# Patient Record
Sex: MALE | Race: OTHER | Employment: UNEMPLOYED | URBAN - METROPOLITAN AREA
[De-identification: names, ages, dates, MRNs, and addresses within clinical notes are randomized per-mention and may not be internally consistent; named-entity substitution may affect disease eponyms.]

---

## 2018-08-28 ENCOUNTER — HOSPITAL ENCOUNTER (EMERGENCY)
Age: 33
Discharge: HOME | End: 2018-08-28
Attending: EMERGENCY MEDICINE
Payer: SELF-PAY

## 2018-08-28 VITALS
SYSTOLIC BLOOD PRESSURE: 169 MMHG | BODY MASS INDEX: 32.96 KG/M2 | HEART RATE: 84 BPM | OXYGEN SATURATION: 95 % | RESPIRATION RATE: 16 BRPM | DIASTOLIC BLOOD PRESSURE: 99 MMHG | HEIGHT: 67 IN | WEIGHT: 210 LBS | TEMPERATURE: 98.5 F

## 2018-08-28 DIAGNOSIS — M51.26 LUMBAR HERNIATED DISC: Primary | ICD-10-CM

## 2018-08-28 PROCEDURE — 99282 EMERGENCY DEPT VISIT SF MDM: CPT

## 2018-08-28 RX ORDER — TRAMADOL HYDROCHLORIDE 50 MG/1
50 TABLET ORAL
COMMUNITY
End: 2018-08-28

## 2018-08-28 RX ORDER — PREDNISONE 10 MG/1
TABLET ORAL
Qty: 21 TAB | Refills: 0 | Status: SHIPPED | OUTPATIENT
Start: 2018-08-28

## 2018-08-28 RX ORDER — OXYCODONE AND ACETAMINOPHEN 5; 325 MG/1; MG/1
1 TABLET ORAL
Qty: 10 TAB | Refills: 0 | Status: SHIPPED | OUTPATIENT
Start: 2018-08-28

## 2018-08-28 NOTE — ED PROVIDER NOTES
HPI Comments: Healthy; presents with 2 weeks or right-sided low back and RLE pain; radiates from buttock to foot; some numbness in right foot; no B/B incontinence; has been seen in a \"clinic\" previously and prescribed NSAID and steroid without relief. Pain is moderate. Patient is a 26 y.o. male presenting with back pain and leg pain. Back Pain    Associated symptoms include leg pain. Leg Pain    Associated symptoms include back pain. No past medical history on file. Past Surgical History:   Procedure Laterality Date    HX APPENDECTOMY           No family history on file. Social History     Social History    Marital status: SINGLE     Spouse name: N/A    Number of children: N/A    Years of education: N/A     Occupational History    Not on file. Social History Main Topics    Smoking status: Never Smoker    Smokeless tobacco: Never Used    Alcohol use No      Comment: occsdionally    Drug use: No    Sexual activity: Yes     Partners: Female     Other Topics Concern    Not on file     Social History Narrative    ** Merged History Encounter **         ** Merged History Encounter **              ALLERGIES: Review of patient's allergies indicates no known allergies. Review of Systems   Musculoskeletal: Positive for back pain. All other systems reviewed and are negative. Vitals:    08/28/18 1731   BP: (!) 169/99   Pulse: 84   Resp: 16   Temp: 98.5 °F (36.9 °C)   SpO2: 95%   Weight: 95.3 kg (210 lb)   Height: 5' 7\" (1.702 m)            Physical Exam   Constitutional: He appears well-developed and well-nourished. HENT:   Head: Normocephalic and atraumatic. Eyes: Conjunctivae are normal. No scleral icterus. Neck: No JVD present. No tracheal deviation present. Cardiovascular: Normal rate. Pulmonary/Chest: Effort normal.   Abdominal: He exhibits no distension. Musculoskeletal: He exhibits no edema.    Right buttock and right low back tenderness   Neurological: He is alert.   Oriented; normal LE strength and sensation   Skin: No rash noted. No pallor. Psychiatric: He has a normal mood and affect. [facility]      ED Course       Procedures      A/P: right low back and RLE pain - c/w HNP vs less likely sciatica; no red flags; Prednisone, limited percocet; spine, [facility] f/u.   Trevor Ross MD  5:50 PM

## 2018-08-28 NOTE — DISCHARGE INSTRUCTIONS
Hernia de disco: Instrucciones de cuidado - [ Herniated Disc: Care Instructions ]  Instrucciones de cuidado    Los huesos que gonzalo la columna vertebral de la espalda están protegidos de roces e impacto por pequeños discos. Si hank de [employer] se daña, podría sobresalir o romperse (herniar). Valentina hernia de disco puede ser el resultado del desgaste normal por envejecimiento o de valentina lesión o enfermedad. Si valentina hernia de disco presiona sobre un nervio, le puede causar dolor y entumecimiento en la pierna (ciática) y/o dolor de espalda. Es posible sanar valentina hernia de disco con unas cuantas semanas o meses de descanso, medicamentos y ejercicios. En algunos casos se podría necesitar cirugía. La atención de seguimiento es valentina parte clave de leal tratamiento y seguridad. Asegúrese de hacer y acudir a todas las citas, y llame a leal médico si está teniendo problemas. También es valentina buena idea saber los resultados de sumit exámenes y mantener valentina lista de los medicamentos que stephane. ¿Cómo puede cuidarse en el hogar? · [employer] medicamentos exactamente tariq le fueron recetados. Llame a leal médico si petty estar teniendo problemas con leal medicamento. · Pregúntele a leal médico si puede gris un analgésico (medicamento para el dolor) de venta jean-pierre, tariq acetaminofén (Tylenol), ibuprofeno (Advil, Motrin) o naproxeno (Aleve). Anne y siga todas las instrucciones de la [place]. · No tome dos o más analgésicos al mismo tiempo a menos que el médico se lo haya indicado. Muchos analgésicos contienen acetaminofén, es decir, Tylenol. El exceso de acetaminofén (Tylenol) puede ser dañino. · Si el dolor es intenso, descanse la espalda. · Evite los movimientos y las posiciones que aumenten el dolor o el entumecimiento. · Intente hacer caminatas cortas o actividades suaves que no le causen dolor. Aun si siente algún dolor, es importante que [place] los músculos [place] y mary jo. · Utilice calor o frío para aliviar el dolor.   ¨ Para aplicar calor, póngase valentina bolsa de agua tibia, valentina almohadilla térmica a baja temperatura o valentina compresa tibia [employer]. No se vaya a dormir con valentina almohadilla térmica sobre la piel. ¨ Para utilizar frío, póngase hielo o valentina compresa fría sobre la rosa arun un período de 10 a 20 minutos cada vez. Póngase un paño montano entre el hielo y la piel. · Leal médico le podría recomendar un programa de fisioterapia, en el que aprenderá ejercicios que puede hacer en el hogar. Estos ejercicios fortalecen los músculos que sostienen la parte baja de la espalda y previenen valentina Lud[employer]ans. · Mantenga un peso saludable. Volta puede reducir la carga en la espalda. · Deje de fumar, si usted fuma. Si necesita ayuda para dejar de fumar, hable con leal médico sobre programas y medicamentos para dejar de fumar. Estos pueden aumentar sumit probabilidades de dejar el hábito para siempre. · Para evitar lesiones en la espalda al levantar peso:  [address] piernas, no con la espalda, poniéndose en cuclillas y [address] [facility]. Evite doblarse hacia adelante por la cintura al levantar algo. ¨ Levántese despacio. ¨ Mantenga la carga tan cerca de leal cuerpo tariq sea posible, a la altura del ombligo. ¨ Evite girar o voltear el cuerpo mientras sostenga un objeto pesado. ¨ Pida ayuda si necesita levantar un objeto pesado. Nunca lo levante por encima del nivel de los hombros.  ¿Cuándo debe pedir ayuda? Llame al 911 en cualquier momento que considere que necesita atención de [place]. Por ejemplo, llame si:    · Es completamente incapaz de  valentina pierna.    Llame a leal médico ahora mismo o busque atención médica inmediata si:    · Tiene síntomas nuevos o peores en los brazos, las piernas, el pecho, el abdomen o las nalgas (glúteos). Los síntomas pueden incluir:  ¨ Entumecimiento u hormigueo. ¨ Debilidad.   ¨ Dolor.     · Pierde el control de la vejiga o del intestino.    Preste especial atención a los cambios en leal willard y asegúrese de comunicarse con leal médico si:    · No mejora tariq se esperaba. ¿Dónde puede encontrar más información en inglés? Elsy Jane a http://emanuel-steven.info/. Escriba F534 en la búsqueda para aprender más acerca de \"Hernia de disco: Instrucciones de cuidado - [ Herniated Disc: Care Instructions ]. \"  Revisado: 29 noviembre, 2017  Versión del contenido: 11.7  © 9859-6590 Healthwise, Incorporated. Las instrucciones de cuidado fueron adaptadas bajo licencia por [employer] (which disclaims liability or warranty for this information). Si usted tiene [place] afección médica o sobre estas instrucciones, siempre pregunte a leal profesional de willard. Healthwise, Incorporated niega toda garantía o responsabilidad por leal uso de esta información.

## 2018-08-28 NOTE — ED TRIAGE NOTES
Pt c/o right leg pain x15 days, pain radiates from right hip down posterior leg into foot. Pt stating his right foot is going numb and swollen. Pt using crutches. Pt has not seen PCP. Pt taking tramadol for pain. Pt denies loss of bowel or bladder function.

## 2018-08-28 NOTE — ED NOTES
Triage completed using [facility] phone for translation (Interpretor #[id number]). Plan of care discussed and questions answered.

## 2023-01-04 ENCOUNTER — OFFICE VISIT (OUTPATIENT)
Dept: NEUROLOGY | Age: 38
End: 2023-01-04
Payer: SUBSIDIZED

## 2023-01-04 VITALS
DIASTOLIC BLOOD PRESSURE: 70 MMHG | SYSTOLIC BLOOD PRESSURE: 120 MMHG | HEART RATE: 96 BPM | OXYGEN SATURATION: 99 % | BODY MASS INDEX: 32.89 KG/M2 | HEIGHT: 67 IN

## 2023-01-04 DIAGNOSIS — R56.9 SEIZURES (HCC): Primary | ICD-10-CM

## 2023-01-04 PROCEDURE — 99245 OFF/OP CONSLTJ NEW/EST HI 55: CPT | Performed by: PSYCHIATRY & NEUROLOGY

## 2023-01-04 RX ORDER — METFORMIN HYDROCHLORIDE 1000 MG/1
1000 TABLET ORAL 2 TIMES DAILY WITH MEALS
COMMUNITY

## 2023-01-04 NOTE — PROGRESS NOTES
NEUROLOGY NEW PATIENT OFFICE CONSULTATION      1/4/2023    RE: Bubba Richard         1985      REFERRED BY:  Dominguez Luevano MD        CHIEF COMPLAINT:  This is Bubba Richard is a 42 y.o. male  [employer] who had concerns including Seizure. HPI:     On 7/20/22, patient was admitted at [address] [place] [address] for twitching of the R UE and L head rotating to the L for 15 secs happening several times that day. In the ER Blood sugar was 463. Admits to not taking his medications. Patient seen by a neurologist. MRI brain and EEG were both normal. Patient was discharged on Keppra 1500 mg BID but ran out of it 3 months ago with no seizures. Blood sugar now better controlled. ROS  (-) fever  (-) rash  All other systems reviewed and are negative    Past Medical Hx  No past medical history on file. Social Hx  Social History     Socioeconomic History    Marital status: SINGLE   Tobacco Use    Smoking status: Never    Smokeless tobacco: Never   Substance and Sexual Activity    Alcohol use: No     Comment: occsdionally    Drug use: No    Sexual activity: Yes     Partners: Female   Social History Narrative    ** Merged History Encounter **         ** Merged History Encounter **            Family Hx  No family history on file. ALLERGIES  No Known Allergies    CURRENT MEDS  Current Outpatient Medications   Medication Sig Dispense Refill    metFORMIN (GLUCOPHAGE) 1,000 mg tablet Take 1,000 mg by mouth two (2) times daily (with meals). empagliflozin (Jardiance) 25 mg tablet Take  by mouth daily. predniSONE (STERAPRED DS) 10 mg dose pack As directed. (Patient not taking: Reported on 1/4/2023) 21 Tab 0    oxyCODONE-acetaminophen (PERCOCET) 5-325 mg per tablet Take 1 Tab by mouth every four (4) hours as needed for Pain. Max Daily Amount: 6 Tabs.  (Patient not taking: Reported on 1/4/2023) 10 Tab 0    cyclobenzaprine (FLEXERIL) 10 mg tablet Take 1 Tab by mouth three (3) times daily as needed for Muscle Spasm(s). (Patient not taking: Reported on 1/4/2023) 20 Tab 0           PREVIOUS WORKUP: (reviewed)  IMAGING:    CT Results (recent):  Results from Hospital Encounter encounter on 06/28/11    CT ABDOMEN PELVIS WITHOUT CONTRAST    Narrative  **Final Report**      ICD Codes / Adm. Diagnosis: 12  692243 / Back Pain  Urinary Pain  Examination:  CT ABD PELVIS WO CON  - [id number] - Jun 28 2011 12:20PM  Accession No:  [id number]  Reason:  abdominal pain      REPORT:  INDICATION:   abdominal pain    EXAM:  CT ABDOMEN, PELVIS WITHOUT CONTRAST    COMPARISON:  None    TECHNIQUE: 5 mm axial images were obtained through the abdomen and pelvis  with no oral or intravenous contrast material.    FINDINGS:    The lung bases are clear. There is hepatic steatosis. The gallbladder is  normal.  The spleen and pancreas are normal.  The adrenal glands are normal.  The left kidney is normal.  There is mild right hydroureteronephrosis with  a 3 mm calculus in the right posterior urinary bladder wall possibly at the  ureter vesicular junction. There is no free intraperitoneal air or fluid. There is no bowel wall thickening or dilatation. The appendix is normal.    There is no pelvic free fluid. .  There is no pelvic lymphadenopathy. The  osseous structures are unremarkable. IMPRESSION:    1. Mild right hydronephrosis with a 3 mm calculus in the posterior right  aspect of the urinary bladder which may be in the ureterovesicular junction. This is visible on the  radiograph. 2. Hepatic steatosis. Signing/Reading Doctor: Karlos Rao ([id number])  Transcribed: n/a on 06/28/2011  Approved: Karlos Rao ([id number])  06/28/2011        Distribution:  Attending Doctor: Eryn Walsh      MRI Results (recent):  No results found for this or any previous visit. IR Results (recent):  No results found for this or any previous visit. VAS/US Results (recent):  No results found for this or any previous visit.           LABS (reviewed)  Results for orders placed or performed during the hospital encounter of 07/11/14   URINALYSIS W/ REFLEX CULTURE    Specimen: Urine   Result Value Ref Range    Color YELLOW/STRAW      Appearance CLEAR CLEAR      Specific gravity 1.017 1.003 - 1.030      pH (UA) 6.0 5.0 - 8.0      Protein NEGATIVE NEG mg/dL    Glucose NEGATIVE NEG mg/dL    Ketone NEGATIVE NEG mg/dL    Bilirubin NEGATIVE NEG      Blood NEGATIVE NEG      Urobilinogen 0.2 0.2 - 1.0 EU/dL    Nitrites NEGATIVE NEG      Leukocyte Esterase NEGATIVE NEG      UA:UC IF INDICATED CULTURE NOT INDICATED BY UA RESULT CNI      WBC 0-4 0 - 4 /hpf    RBC 0-5 0 - 5 /hpf    Epithelial cells FEW FEW /lpf    Bacteria NEGATIVE NEG /hpf    Hyaline cast 0-2 0 - 2       Physical Exam:   Visit Vitals  /70   Pulse 96   Ht 5' 7\" (1.702 m)   SpO2 99%   BMI 32.89 kg/m²     General:  Alert, cooperative, no distress. Head:  Normocephalic, without obvious abnormality, atraumatic. Eyes:  Conjunctivae/corneas clear. Lungs:  Heart:   Non labored breathing  Regular rate and rhythm, no carotid bruits   Abdomen:   Soft, non-distended   Extremities: Extremities normal, atraumatic, no cyanosis or edema. Pulses: 2+ and symmetric all extremities. Skin: Skin color, texture, turgor normal. No rashes or lesions.   Neurologic Exam     Gen: Attention normal             Language: naming, repetition, fluency normal             Memory: intact recent and remote memory  Cranial Nerves:  I: smell Not tested   II: visual fields Full to confrontation   II: pupils Equal, round, reactive to light   II: optic disc No papilledema   III,VII: ptosis none   III,IV,VI: extraocular muscles  Full ROM   V: mastication normal   V: facial light touch sensation  normal   VII: facial muscle function   symmetric   VIII: hearing symmetric   IX: soft palate elevation  normal   XI: trapezius strength  5/5   XI: sternocleidomastoid strength 5/5   XI: neck flexion strength  5/5   XII: tongue  midline Motor: normal bulk and tone, no tremor              Strength: 5/5 all four extremities  Sensory: intact to LT, PP, vibration, and JPS  Reflexes: 2+ UE 3+ knees 2+ ankles throughout; Down going toes  Coordination: Good FTN and HTS  Gait: normal gait including tandem            Impression:     Nithin Siu is a 42 y.o. male who  has no past medical history on file. who on 7/20/22, patient was admitted at [address] [facility] for twitching of the R UE and L head rotating to the L for 15 secs happening several times that day. In the ER Blood sugar was 463. Admits to not taking his medications. Patient seen by a neurologist. MRI brain and EEG were both normal. Patient was discharged on Keppra 1500 mg BID but ran out of it 3 months ago with no seizures. Blood sugar now better controlled. Considerations include seizure event provoked by uncontrolled blood sugar due to non-compliance. RECOMMENDATIONS  1. I had a long discussion with patient and wife via [employer]   (phone) . Discussed diagnosis, prognosis, pathophysiology and available treatment. Reviewed test results. All questions were answered. 2. Reviewed medical records from outside  3. Since patient has not had seizures for 3 months off Keppra, his seizure was provoked by uncontrolled blood sugar,  and MRI bran and EEG were normal, will hold off putting him back on seizure meds and will observe for now  4. Will repeat EEG to look for subclinical seizure activity off Keppra. If normal will not put on meds  5. Optimize medical management of blood sugar c/o PCP  6. Advise to avoid alcohol  7. Reminded he cannot drive for 6 months from last seizure event as per [place] DMV policy    I spent total of 80 mins with the patient, greater than 50% of the visit was spent on providing counseling and coordination of care. Follow-up and Dispositions    Return for review of results.             Thank you for the consultation      Pavan Caldera MD  Diplomate, American Board of Psychiatry and Neurology  Diplomate, Neuromuscular Medicine  Diplomate, American Board of Electrodiagnostic Medicine        CC: Asher Tobar MD  Fax: [phone]

## 2023-01-04 NOTE — LETTER
1/4/2023    Patient: Jesus Lozoya   YOB: 1985   Date of Visit: 1/4/2023     Matilde Lugo, [address] [facility] [address]  Via Fax: [phone]    Dear Matilde Lugo MD,      Thank you for referring Mr. Jesus Lozoya to [address] [place] [address] for evaluation. My notes for this consultation are attached. If you have questions, please do not hesitate to call me. I look forward to following your patient along with you.       Sincerely,    Vania Gama MD